# Patient Record
Sex: MALE | Race: WHITE | NOT HISPANIC OR LATINO
[De-identification: names, ages, dates, MRNs, and addresses within clinical notes are randomized per-mention and may not be internally consistent; named-entity substitution may affect disease eponyms.]

---

## 2020-04-22 ENCOUNTER — APPOINTMENT (OUTPATIENT)
Dept: ENDOCRINOLOGY | Facility: CLINIC | Age: 65
End: 2020-04-22

## 2020-08-20 ENCOUNTER — APPOINTMENT (OUTPATIENT)
Dept: ENDOCRINOLOGY | Facility: CLINIC | Age: 65
End: 2020-08-20
Payer: COMMERCIAL

## 2020-08-20 VITALS
BODY MASS INDEX: 25.73 KG/M2 | DIASTOLIC BLOOD PRESSURE: 70 MMHG | HEART RATE: 72 BPM | WEIGHT: 190 LBS | HEIGHT: 72 IN | RESPIRATION RATE: 16 BRPM | SYSTOLIC BLOOD PRESSURE: 110 MMHG | OXYGEN SATURATION: 98 %

## 2020-08-20 DIAGNOSIS — Z87.891 PERSONAL HISTORY OF NICOTINE DEPENDENCE: ICD-10-CM

## 2020-08-20 DIAGNOSIS — R42 DIZZINESS AND GIDDINESS: ICD-10-CM

## 2020-08-20 DIAGNOSIS — Z83.49 FAMILY HISTORY OF OTHER ENDOCRINE, NUTRITIONAL AND METABOLIC DISEASES: ICD-10-CM

## 2020-08-20 DIAGNOSIS — R94.6 ABNORMAL RESULTS OF THYROID FUNCTION STUDIES: ICD-10-CM

## 2020-08-20 DIAGNOSIS — N39.0 URINARY TRACT INFECTION, SITE NOT SPECIFIED: ICD-10-CM

## 2020-08-20 PROBLEM — Z00.00 ENCOUNTER FOR PREVENTIVE HEALTH EXAMINATION: Status: ACTIVE | Noted: 2020-08-20

## 2020-08-20 PROCEDURE — 99204 OFFICE O/P NEW MOD 45 MIN: CPT

## 2020-08-20 RX ORDER — TERAZOSIN HCL 5 MG
5 CAPSULE ORAL
Refills: 0 | Status: ACTIVE | COMMUNITY

## 2020-08-20 RX ORDER — MECLIZINE HYDROCHLORIDE 25 MG/1
25 TABLET ORAL 4 TIMES DAILY
Refills: 0 | Status: ACTIVE | COMMUNITY

## 2020-08-20 NOTE — ASSESSMENT
[FreeTextEntry1] : - repeat an extended thyroid panel, antibodies status\par - thyroid US\par - check am cortisol, ACTH, testosterone, gonadotropins\par RTC post labs

## 2020-08-20 NOTE — CONSULT LETTER
[Dear  ___] : Dear  [unfilled], [Sincerely,] : Sincerely, [FreeTextEntry1] : Thank you for referring  Mr. JOSE TREJO to me for evaluation and treatment. Please, see attached consultation note. As always, if there are specific questions you would like to discuss, please feel free to contact me.\par Thank you for the courtesy of this evaluation.\par  [FreeTextEntry3] : Saud Dobson MD, FACE, ECNU\par

## 2020-08-20 NOTE — HISTORY OF PRESENT ILLNESS
[FreeTextEntry1] : 65 year male referred for thyroid evaluation.\par Mr Eastman was diagnosed with Graves' disease about 15 years. He was under care of Dr. Snow at that time, and was treated with antithyroidal medications for about 3 years. He's been in remission since that time. He's concerned with his occasional dizziness and vertigo when he lies down for the past year. He saw an ENT and was diagnosed with Meniere disease. He also noticed some hot flashes, mostly at night. His erections are somewhat worse, and his libido has been very low.\par labs from 7/7/20- TSH- 0.88, T4- 7.8\par from 12/17/19- TSH- 0.12 and from 7/3/19- 0.33\par His ALP was persistently elevated ranging 123-143. he's not on any vitamin D supplements \par \par

## 2020-09-11 ENCOUNTER — APPOINTMENT (OUTPATIENT)
Dept: ENDOCRINOLOGY | Facility: CLINIC | Age: 65
End: 2020-09-11
Payer: COMMERCIAL

## 2020-09-11 ENCOUNTER — NON-APPOINTMENT (OUTPATIENT)
Age: 65
End: 2020-09-11

## 2020-09-11 PROCEDURE — 99214 OFFICE O/P EST MOD 30 MIN: CPT | Mod: 95

## 2020-09-11 NOTE — HISTORY OF PRESENT ILLNESS
[Home] : at home, [unfilled] , at the time of the visit. [Medical Office: ([facility])___] : at the medical office located in  [Verbal consent obtained from patient] : the patient, [unfilled] [FreeTextEntry1] : 65 year male f/u for multiple medical issues\par \par \par *** Sep 11, 2020 ***\par \par TSH- 0.4, FT4- 1.1, T3 - 128\par + TSI/TBII ab\par prl- 5.5\par am cortisol- 13.7 with ACTH-20\par testo- 4.35, free T- 55 but FSH/LH- 29/17.6\par \par patient states today, that he's been off meds for at least 8 years\par \par Thyr US (8/27/20)- RMP iso 0.8x0.6x0.4 (no micro), LMP iso 0.6x0.4x0.3 (no micro), isthmic 1.0x1.3x0.7 hypo with small central cystic component (no micro)\par \par HPI:\par Mr Eastman was diagnosed with Graves' disease about 15 years. He was under care of Dr. Snow at that time, and was treated with antithyroidal medications for about 3 years. He's been in remission since that time. He's concerned with his occasional dizziness and vertigo when he lies down for the past year. He saw an ENT and was diagnosed with Meniere disease. He also noticed some hot flashes, mostly at night. His erections are somewhat worse, and his libido has been very low.\par labs from 7/7/20- TSH- 0.88, T4- 7.8\par from 12/17/19- TSH- 0.12 and from 7/3/19- 0.33\par His ALP was persistently elevated ranging 123-143. he's not on any vitamin D supplements \par \par

## 2020-09-11 NOTE — ASSESSMENT
[FreeTextEntry1] : - repeat testo, g-tropins\par - given his symptoms, might benefit from T replacement\par - R+B of testosterone replacement reviewed in details, including worsening of KEVIN and potential adverse effects on CV system, effect on hematocrit, PSA, and liver functions, as well as decrease in sperm count.\par - I reviewed with the patient topical vs injectable vs implantable testosterone preparations, as well as proper use/applications/precautions and monitoring.\par - in regards of MNG, pt wants to proceed with FNA soon. Will schedule a bx of the isthmic nodule\par - I've also advised on Graves' (+) ab and need in frequent tft's monitoring

## 2020-09-11 NOTE — REASON FOR VISIT
[Follow - Up] : a follow-up visit [Hyperthyroidism] : hyperthyroidism [Thyroid nodule/ MNG] : thyroid nodule/ MNG [Hypogonadism] : hypogonadism

## 2020-09-18 ENCOUNTER — LABORATORY RESULT (OUTPATIENT)
Age: 65
End: 2020-09-18

## 2020-09-18 ENCOUNTER — APPOINTMENT (OUTPATIENT)
Dept: ENDOCRINOLOGY | Facility: CLINIC | Age: 65
End: 2020-09-18
Payer: COMMERCIAL

## 2020-09-18 PROCEDURE — 10005 FNA BX W/US GDN 1ST LES: CPT

## 2020-09-18 NOTE — PROCEDURE
[Fine Needle Aspiration] : Fine needle aspiration ~T ~C was performed. [Risks] : risks [Benefits] : benefits [Alternatives] : alternatives [Consent Obtained] : Written consent was obtained prior to the procedure and is detailed in the patient's record [Patient] : the patient [Ethyl Chloride] : ethyl chloride [EMLA Cream] : EMLA cream [25 gauge 1.5 inch] : A 25 gauge 1.5 inch needle was used [____ Passes] : [unfilled] passes were made through the mass [Ultrasonic Guidance] : ultrasound guidance was employed [Sent to Histology] : The specimens were prepared in the usual manner and sent to histology. [Thyroseq] : Thyroseq [Tolerated Well] : the patient tolerated the procedure well [Vital Signs Stable] : the vital signs were stable [No Complications] : There were no complications [Instructions Given] : Handouts/patient instructions were given to patient [___ Week(s)] : in [unfilled] week(s). [de-identified] : left isthmic 1.4 cm thyroid nodule

## 2020-11-23 ENCOUNTER — TRANSCRIPTION ENCOUNTER (OUTPATIENT)
Age: 65
End: 2020-11-23

## 2020-11-23 ENCOUNTER — APPOINTMENT (OUTPATIENT)
Dept: ENDOCRINOLOGY | Facility: CLINIC | Age: 65
End: 2020-11-23
Payer: COMMERCIAL

## 2020-11-23 VITALS
WEIGHT: 190 LBS | BODY MASS INDEX: 25.73 KG/M2 | OXYGEN SATURATION: 98 % | RESPIRATION RATE: 16 BRPM | DIASTOLIC BLOOD PRESSURE: 60 MMHG | TEMPERATURE: 97.5 F | HEART RATE: 74 BPM | HEIGHT: 72 IN | SYSTOLIC BLOOD PRESSURE: 110 MMHG

## 2020-11-23 PROCEDURE — 36415 COLL VENOUS BLD VENIPUNCTURE: CPT

## 2020-11-23 PROCEDURE — 99214 OFFICE O/P EST MOD 30 MIN: CPT | Mod: 25

## 2020-11-23 NOTE — ASSESSMENT
[FreeTextEntry1] : - repeat testo, g-tropins\par - given clinical improvement from TRT, patient wants to retry a small dose (1 depression daily) and monitor for symptoms\par - alternative options for axillary/IM applications reviewed, might try if no current regimen does not work\par - in regards of MNG, s/p benign FNA  of the isthmic nodule. Repeat thyroid US in 03/21\par - I've also advised on Graves' (+) ab and need in frequent tft's monitoring\par RTC 3 mos

## 2020-11-23 NOTE — HISTORY OF PRESENT ILLNESS
[FreeTextEntry1] : 65 year male f/u for multiple medical issues\par \par *** Nov 23, 2020 ***\par \par felt much better on androderm 2 depressions daily (decr hot flashes, thinking more clear less fatigue), but developed an insomnia and "weird dreams". stopped b/o that after 2 weeks\par repeat testo- 269, free T- 40\par FSH/LH-27/18\par \par s/p FNAB (9/18/20) of left isthmic 1.4cm nodule - benign, adenomatous nodular goiter\par \par *** Sep 11, 2020 ***\par \par TSH- 0.4, FT4- 1.1, T3 - 128\par + TSI/TBII ab\par prl- 5.5\par am cortisol- 13.7 with ACTH-20\par testo- 4.35, free T- 55 but FSH/LH- 29/17.6\par \par patient states today, that he's been off meds for at least 8 years\par \par Thyr US (8/27/20)- RMP iso 0.8x0.6x0.4 (no micro), LMP iso 0.6x0.4x0.3 (no micro), isthmic 1.0x1.3x0.7 hypo with small central cystic component (no micro)\par \par HPI:\par Mr Eastman was diagnosed with Graves' disease about 15 years. He was under care of Dr. Snow at that time, and was treated with antithyroidal medications for about 3 years. He's been in remission since that time. He's concerned with his occasional dizziness and vertigo when he lies down for the past year. He saw an ENT and was diagnosed with Meniere disease. He also noticed some hot flashes, mostly at night. His erections are somewhat worse, and his libido has been very low.\par labs from 7/7/20- TSH- 0.88, T4- 7.8\par from 12/17/19- TSH- 0.12 and from 7/3/19- 0.33\par His ALP was persistently elevated ranging 123-143. he's not on any vitamin D supplements \par \par

## 2020-11-24 ENCOUNTER — TRANSCRIPTION ENCOUNTER (OUTPATIENT)
Age: 65
End: 2020-11-24

## 2020-11-24 LAB
25(OH)D3 SERPL-MCNC: 13.7 NG/ML
ALBUMIN SERPL ELPH-MCNC: 4.5 G/DL
ALP BLD-CCNC: 117 U/L
ALT SERPL-CCNC: 51 U/L
AST SERPL-CCNC: 33 U/L
BASOPHILS # BLD AUTO: 0.03 K/UL
BASOPHILS NFR BLD AUTO: 0.5 %
BILIRUB DIRECT SERPL-MCNC: 0.1 MG/DL
BILIRUB INDIRECT SERPL-MCNC: 0.2 MG/DL
BILIRUB SERPL-MCNC: 0.3 MG/DL
EOSINOPHIL # BLD AUTO: 0.15 K/UL
EOSINOPHIL NFR BLD AUTO: 2.7 %
FSH SERPL-MCNC: 25.6 IU/L
HCT VFR BLD CALC: 44.5 %
HGB BLD-MCNC: 14.4 G/DL
IMM GRANULOCYTES NFR BLD AUTO: 0.4 %
LH SERPL-ACNC: 18 IU/L
LYMPHOCYTES # BLD AUTO: 1.56 K/UL
LYMPHOCYTES NFR BLD AUTO: 28 %
MAN DIFF?: NORMAL
MCHC RBC-ENTMCNC: 30.1 PG
MCHC RBC-ENTMCNC: 32.4 GM/DL
MCV RBC AUTO: 93.1 FL
MONOCYTES # BLD AUTO: 0.72 K/UL
MONOCYTES NFR BLD AUTO: 12.9 %
NEUTROPHILS # BLD AUTO: 3.09 K/UL
NEUTROPHILS NFR BLD AUTO: 55.5 %
PLATELET # BLD AUTO: 289 K/UL
PROT SERPL-MCNC: 6.9 G/DL
PSA SERPL-MCNC: 0.76 NG/ML
RBC # BLD: 4.78 M/UL
RBC # FLD: 12.8 %
SHBG SERPL-SCNC: 43 NMOL/L
T3 SERPL-MCNC: 124 NG/DL
T4 FREE SERPL-MCNC: 1 NG/DL
TSH SERPL-ACNC: 0.62 UIU/ML
WBC # FLD AUTO: 5.57 K/UL

## 2020-11-28 LAB
TESTOST BND SERPL-MCNC: 6.7 PG/ML
TESTOST SERPL-MCNC: 252.8 NG/DL

## 2020-12-08 ENCOUNTER — TRANSCRIPTION ENCOUNTER (OUTPATIENT)
Age: 65
End: 2020-12-08

## 2020-12-10 ENCOUNTER — TRANSCRIPTION ENCOUNTER (OUTPATIENT)
Age: 65
End: 2020-12-10

## 2021-02-18 ENCOUNTER — TRANSCRIPTION ENCOUNTER (OUTPATIENT)
Age: 66
End: 2021-02-18

## 2021-02-18 RX ORDER — TESTOSTERONE 20.25 MG/1.25G
1.62 GEL, METERED TRANSDERMAL
Qty: 1 | Refills: 0 | Status: DISCONTINUED | COMMUNITY
Start: 2020-10-02 | End: 2021-02-18

## 2021-03-08 ENCOUNTER — APPOINTMENT (OUTPATIENT)
Dept: ENDOCRINOLOGY | Facility: CLINIC | Age: 66
End: 2021-03-08
Payer: COMMERCIAL

## 2021-03-08 VITALS
HEART RATE: 68 BPM | OXYGEN SATURATION: 98 % | HEIGHT: 72 IN | SYSTOLIC BLOOD PRESSURE: 115 MMHG | BODY MASS INDEX: 25.73 KG/M2 | DIASTOLIC BLOOD PRESSURE: 60 MMHG | RESPIRATION RATE: 16 BRPM | TEMPERATURE: 98 F | WEIGHT: 190 LBS

## 2021-03-08 PROCEDURE — 99214 OFFICE O/P EST MOD 30 MIN: CPT

## 2021-03-08 PROCEDURE — 99072 ADDL SUPL MATRL&STAF TM PHE: CPT

## 2021-03-08 NOTE — HISTORY OF PRESENT ILLNESS
[FreeTextEntry1] : 65 year male f/u for multiple medical issues\par \par *** Mar 08, 2021 ***\par \par feels better on axiron 1 depression daily , drisdol 50K qw\par much  less hot flashes, no more brain fog. Insomnia improved since switching to 1 depression\par *** prev intolerant of androgel\par \par no recent thyr US\par labs from 2/19/21- LDL- 109, cRP- 6.5, 25D- 24, TSH- 2.6, T4- 7.3, PSA_ 0.9, H/H-14.7/44.7, normal lft's, a1c- 5.5\par \par *** Nov 23, 2020 ***\par \par felt much better on androderm 2 depressions daily (decr hot flashes, thinking more clear less fatigue), but developed an insomnia and "weird dreams". stopped b/o that after 2 weeks\par repeat testo- 269, free T- 40\par FSH/LH-27/18\par \par s/p FNAB (9/18/20) of left isthmic 1.4cm nodule - benign, adenomatous nodular goiter\par \par *** Sep 11, 2020 ***\par \par TSH- 0.4, FT4- 1.1, T3 - 128\par + TSI/TBII ab\par prl- 5.5\par am cortisol- 13.7 with ACTH-20\par testo- 4.35, free T- 55 but FSH/LH- 29/17.6\par \par patient states today, that he's been off meds for at least 8 years\par \par Thyr US (8/27/20)- RMP iso 0.8x0.6x0.4 (no micro), LMP iso 0.6x0.4x0.3 (no micro), isthmic 1.0x1.3x0.7 hypo with small central cystic component (no micro)\par \par HPI:\par Mr Eastman was diagnosed with Graves' disease about 15 years. He was under care of Dr. Snow at that time, and was treated with antithyroidal medications for about 3 years. He's been in remission since that time. He's concerned with his occasional dizziness and vertigo when he lies down for the past year. He saw an ENT and was diagnosed with Meniere disease. He also noticed some hot flashes, mostly at night. His erections are somewhat worse, and his libido has been very low.\par labs from 7/7/20- TSH- 0.88, T4- 7.8\par from 12/17/19- TSH- 0.12 and from 7/3/19- 0.33\par His ALP was persistently elevated ranging 123-143. he's not on any vitamin D supplements \par \par

## 2021-03-08 NOTE — ASSESSMENT
[FreeTextEntry1] : - repeat testo through\par - given clinical improvement from TRT, cont with axiron 1 depression daily and monitor for symptoms\par - in regards of MNG, s/p benign FNA  of the isthmic nodule. Repeat thyroid US in 03/21\par - I've also advised on Graves' (+) ab and need in frequent tft's monitoring\par - cardiology f/u advised (on TRT, elevated cRP)\par RTC 3 mos

## 2021-04-16 ENCOUNTER — TRANSCRIPTION ENCOUNTER (OUTPATIENT)
Age: 66
End: 2021-04-16

## 2021-04-23 ENCOUNTER — TRANSCRIPTION ENCOUNTER (OUTPATIENT)
Age: 66
End: 2021-04-23

## 2021-06-01 ENCOUNTER — TRANSCRIPTION ENCOUNTER (OUTPATIENT)
Age: 66
End: 2021-06-01

## 2021-06-17 ENCOUNTER — APPOINTMENT (OUTPATIENT)
Dept: ENDOCRINOLOGY | Facility: CLINIC | Age: 66
End: 2021-06-17
Payer: COMMERCIAL

## 2021-06-17 VITALS
HEART RATE: 66 BPM | WEIGHT: 195 LBS | BODY MASS INDEX: 26.41 KG/M2 | SYSTOLIC BLOOD PRESSURE: 105 MMHG | OXYGEN SATURATION: 98 % | TEMPERATURE: 98.4 F | HEIGHT: 72 IN | RESPIRATION RATE: 16 BRPM | DIASTOLIC BLOOD PRESSURE: 66 MMHG

## 2021-06-17 LAB
BASOPHILS # BLD AUTO: 0.07 K/UL
BASOPHILS NFR BLD AUTO: 1.1 %
EOSINOPHIL # BLD AUTO: 0.28 K/UL
EOSINOPHIL NFR BLD AUTO: 4.2 %
HCT VFR BLD CALC: 46.6 %
HGB BLD-MCNC: 15.2 G/DL
IMM GRANULOCYTES NFR BLD AUTO: 0.3 %
LYMPHOCYTES # BLD AUTO: 1.97 K/UL
LYMPHOCYTES NFR BLD AUTO: 29.7 %
MAN DIFF?: NORMAL
MCHC RBC-ENTMCNC: 30.3 PG
MCHC RBC-ENTMCNC: 32.6 GM/DL
MCV RBC AUTO: 93 FL
MONOCYTES # BLD AUTO: 0.88 K/UL
MONOCYTES NFR BLD AUTO: 13.3 %
NEUTROPHILS # BLD AUTO: 3.41 K/UL
NEUTROPHILS NFR BLD AUTO: 51.4 %
PLATELET # BLD AUTO: 286 K/UL
RBC # BLD: 5.01 M/UL
RBC # FLD: 13 %
WBC # FLD AUTO: 6.63 K/UL

## 2021-06-17 PROCEDURE — 99072 ADDL SUPL MATRL&STAF TM PHE: CPT

## 2021-06-17 PROCEDURE — 99214 OFFICE O/P EST MOD 30 MIN: CPT | Mod: 25

## 2021-06-17 PROCEDURE — 36415 COLL VENOUS BLD VENIPUNCTURE: CPT

## 2021-06-17 NOTE — HISTORY OF PRESENT ILLNESS
[FreeTextEntry1] : 65 year male f/u for multiple medical issues\par \par *** Jun 17, 2021 ***\par \par feels well. hot flashes and insomnia are significantly reduced since starting TRT. libido is still decreased, but energy has improved\par taking axiron 1 depression daily\par \par Thyr US (4/5/21)-  RMP 0.5x0.3x0.5, isthmic 1.5x1.3x0.7 iso - all stable. Prev noted Left thyroid nodule is not seen\par \par *** Mar 08, 2021 ***\par \par feels better on axiron 1 depression daily , drisdol 50K qw\par much  less hot flashes, no more brain fog. Insomnia improved since switching to 1 depression\par *** prev intolerant of androgel\par \par no recent thyr US\par labs from 2/19/21- LDL- 109, cRP- 6.5, 25D- 24, TSH- 2.6, T4- 7.3, PSA_ 0.9, H/H-14.7/44.7, normal lft's, a1c- 5.5\par \par *** Nov 23, 2020 ***\par \par felt much better on androderm 2 depressions daily (decr hot flashes, thinking more clear less fatigue), but developed an insomnia and "weird dreams". stopped b/o that after 2 weeks\par repeat testo- 269, free T- 40\par FSH/LH-27/18\par \par s/p FNAB (9/18/20) of left isthmic 1.4cm nodule - benign, adenomatous nodular goiter\par \par *** Sep 11, 2020 ***\par \par TSH- 0.4, FT4- 1.1, T3 - 128\par + TSI/TBII ab\par prl- 5.5\par am cortisol- 13.7 with ACTH-20\par testo- 4.35, free T- 55 but FSH/LH- 29/17.6\par \par patient states today, that he's been off meds for at least 8 years\par \par Thyr US (8/27/20)- RMP iso 0.8x0.6x0.4 (no micro), LMP iso 0.6x0.4x0.3 (no micro), isthmic 1.0x1.3x0.7 hypo with small central cystic component (no micro)\par \par HPI:\par Mr Eastman was diagnosed with Graves' disease about 15 years. He was under care of Dr. Snow at that time, and was treated with antithyroidal medications for about 3 years. He's been in remission since that time. He's concerned with his occasional dizziness and vertigo when he lies down for the past year. He saw an ENT and was diagnosed with Meniere disease. He also noticed some hot flashes, mostly at night. His erections are somewhat worse, and his libido has been very low.\par labs from 7/7/20- TSH- 0.88, T4- 7.8\par from 12/17/19- TSH- 0.12 and from 7/3/19- 0.33\par His ALP was persistently elevated ranging 123-143. he's not on any vitamin D supplements \par \par

## 2021-06-17 NOTE — ASSESSMENT
[FreeTextEntry1] : 1. Hypogonadism\par - repeat testo through today\par - given clinical improvement from TRT, cont with axiron 1 depression daily and monitor for symptoms\par - cardiology f/u advised (on TRT, elevated cRP)\par \par 2. MNG\par - s/p benign FNA  of the isthmic nodule. Stable sono- repeat thyroid US in 4/22\par - I've also advised on Graves' (+) ab and need in frequent tft's monitoring\par \par RTC 3-4 mos

## 2021-06-18 ENCOUNTER — TRANSCRIPTION ENCOUNTER (OUTPATIENT)
Age: 66
End: 2021-06-18

## 2021-06-18 LAB
25(OH)D3 SERPL-MCNC: 26.8 NG/ML
ALBUMIN SERPL ELPH-MCNC: 4.8 G/DL
ALP BLD-CCNC: 107 U/L
ALT SERPL-CCNC: 25 U/L
AST SERPL-CCNC: 22 U/L
BILIRUB DIRECT SERPL-MCNC: 0.1 MG/DL
BILIRUB INDIRECT SERPL-MCNC: 0.2 MG/DL
BILIRUB SERPL-MCNC: 0.2 MG/DL
PROT SERPL-MCNC: 7.4 G/DL
PSA SERPL-MCNC: 1 NG/ML
T3 SERPL-MCNC: 129 NG/DL
T4 FREE SERPL-MCNC: 1 NG/DL
TSH SERPL-ACNC: 0.65 UIU/ML

## 2021-06-20 ENCOUNTER — TRANSCRIPTION ENCOUNTER (OUTPATIENT)
Age: 66
End: 2021-06-20

## 2021-06-21 LAB
TESTOST BND SERPL-MCNC: 9.9 PG/ML
TESTOSTERONE TOTAL S: 356 NG/DL
TSH RECEPTOR AB: <1.1 IU/L

## 2021-06-22 LAB
SHBG SERPL-SCNC: 38.8 NMOL/L
TSI ACT/NOR SER: <0.1 IU/L

## 2021-08-10 ENCOUNTER — RX RENEWAL (OUTPATIENT)
Age: 66
End: 2021-08-10

## 2021-08-10 RX ORDER — ERGOCALCIFEROL 1.25 MG/1
1.25 MG CAPSULE, LIQUID FILLED ORAL
Qty: 13 | Refills: 2 | Status: ACTIVE | COMMUNITY
Start: 2020-11-24 | End: 1900-01-01

## 2021-08-24 ENCOUNTER — TRANSCRIPTION ENCOUNTER (OUTPATIENT)
Age: 66
End: 2021-08-24

## 2021-08-27 ENCOUNTER — TRANSCRIPTION ENCOUNTER (OUTPATIENT)
Age: 66
End: 2021-08-27

## 2021-11-23 ENCOUNTER — APPOINTMENT (OUTPATIENT)
Dept: ENDOCRINOLOGY | Facility: CLINIC | Age: 66
End: 2021-11-23
Payer: COMMERCIAL

## 2021-11-23 VITALS
SYSTOLIC BLOOD PRESSURE: 124 MMHG | DIASTOLIC BLOOD PRESSURE: 72 MMHG | BODY MASS INDEX: 26.41 KG/M2 | TEMPERATURE: 97.2 F | RESPIRATION RATE: 16 BRPM | OXYGEN SATURATION: 98 % | WEIGHT: 195 LBS | HEIGHT: 72 IN | HEART RATE: 82 BPM

## 2021-11-23 PROCEDURE — 36415 COLL VENOUS BLD VENIPUNCTURE: CPT

## 2021-11-23 PROCEDURE — 99214 OFFICE O/P EST MOD 30 MIN: CPT | Mod: 25

## 2021-11-23 NOTE — HISTORY OF PRESENT ILLNESS
[FreeTextEntry1] : 65 year male f/u for multiple medical issues\par \par *** Nov 23, 2021 ***\par \par feels great. hot flashes and night sweats are gone. Energy is good.  tolerates Axiron well. Libido is still low\par dx'ed with KEVIN , in a process of adjusting his CPAP machine\par \par *** Jun 17, 2021 ***\par \par feels well. hot flashes and insomnia are significantly reduced since starting TRT. libido is still decreased, but energy has improved\par taking axiron 1 depression daily\par \par Thyr US (4/5/21)-  RMP 0.5x0.3x0.5, isthmic 1.5x1.3x0.7 iso - all stable. Prev noted Left thyroid nodule is not seen\par \par *** Mar 08, 2021 ***\par \par feels better on axiron 1 depression daily , drisdol 50K qw\par much  less hot flashes, no more brain fog. Insomnia improved since switching to 1 depression\par *** prev intolerant of androgel\par \par no recent thyr US\par labs from 2/19/21- LDL- 109, cRP- 6.5, 25D- 24, TSH- 2.6, T4- 7.3, PSA_ 0.9, H/H-14.7/44.7, normal lft's, a1c- 5.5\par \par *** Nov 23, 2020 ***\par \par felt much better on androderm 2 depressions daily (decr hot flashes, thinking more clear less fatigue), but developed an insomnia and "weird dreams". stopped b/o that after 2 weeks\par repeat testo- 269, free T- 40\par FSH/LH-27/18\par \par s/p FNAB (9/18/20) of left isthmic 1.4cm nodule - benign, adenomatous nodular goiter\par \par *** Sep 11, 2020 ***\par \par TSH- 0.4, FT4- 1.1, T3 - 128\par + TSI/TBII ab\par prl- 5.5\par am cortisol- 13.7 with ACTH-20\par testo- 4.35, free T- 55 but FSH/LH- 29/17.6\par \par patient states today, that he's been off meds for at least 8 years\par \par Thyr US (8/27/20)- RMP iso 0.8x0.6x0.4 (no micro), LMP iso 0.6x0.4x0.3 (no micro), isthmic 1.0x1.3x0.7 hypo with small central cystic component (no micro)\par \par HPI:\par Mr Eastman was diagnosed with Graves' disease about 15 years. He was under care of Dr. Snow at that time, and was treated with antithyroidal medications for about 3 years. He's been in remission since that time. He's concerned with his occasional dizziness and vertigo when he lies down for the past year. He saw an ENT and was diagnosed with Meniere disease. He also noticed some hot flashes, mostly at night. His erections are somewhat worse, and his libido has been very low.\par labs from 7/7/20- TSH- 0.88, T4- 7.8\par from 12/17/19- TSH- 0.12 and from 7/3/19- 0.33\par His ALP was persistently elevated ranging 123-143. he's not on any vitamin D supplements \par \par

## 2021-11-23 NOTE — REASON FOR VISIT
[Follow - Up] : a follow-up visit [Hyperthyroidism] : hyperthyroidism [Thyroid nodule/ MNG] : thyroid nodule/ MNG [Hypogonadism] : hypogonadism [Spouse] : spouse

## 2021-11-23 NOTE — ASSESSMENT
[FreeTextEntry1] : 1. Hypogonadism\par - repeat testo through today\par - given clinical improvement from TRT, cont with axiron 1 depression daily and monitor for symptoms\par - cardiology f/u advised (on TRT, elevated cRP)\par \par 2. MNG\par - s/p benign FNA  of the isthmic nodule. Stable sono- repeat thyroid US in 4/22\par - I've also advised on Graves' (+) ab and need in frequent tft's monitoring\par \par RTC 4 mos

## 2021-11-24 LAB
25(OH)D3 SERPL-MCNC: 24.8 NG/ML
ALBUMIN SERPL ELPH-MCNC: 4.5 G/DL
ALP BLD-CCNC: 79 U/L
ALT SERPL-CCNC: 23 U/L
AST SERPL-CCNC: 20 U/L
BASOPHILS # BLD AUTO: 0.05 K/UL
BASOPHILS NFR BLD AUTO: 0.7 %
BILIRUB DIRECT SERPL-MCNC: 0.1 MG/DL
BILIRUB INDIRECT SERPL-MCNC: 0.2 MG/DL
BILIRUB SERPL-MCNC: 0.2 MG/DL
EOSINOPHIL # BLD AUTO: 0.35 K/UL
EOSINOPHIL NFR BLD AUTO: 5.2 %
HCT VFR BLD CALC: 46.3 %
HGB BLD-MCNC: 15 G/DL
IMM GRANULOCYTES NFR BLD AUTO: 0.4 %
LYMPHOCYTES # BLD AUTO: 1.99 K/UL
LYMPHOCYTES NFR BLD AUTO: 29.7 %
MAN DIFF?: NORMAL
MCHC RBC-ENTMCNC: 30.2 PG
MCHC RBC-ENTMCNC: 32.4 GM/DL
MCV RBC AUTO: 93.3 FL
MONOCYTES # BLD AUTO: 0.99 K/UL
MONOCYTES NFR BLD AUTO: 14.8 %
NEUTROPHILS # BLD AUTO: 3.29 K/UL
NEUTROPHILS NFR BLD AUTO: 49.2 %
PLATELET # BLD AUTO: 311 K/UL
PROT SERPL-MCNC: 7 G/DL
PSA SERPL-MCNC: 0.98 NG/ML
RBC # BLD: 4.96 M/UL
RBC # FLD: 13.1 %
T4 FREE SERPL-MCNC: 0.9 NG/DL
TSH SERPL-ACNC: 0.99 UIU/ML
WBC # FLD AUTO: 6.7 K/UL

## 2021-11-27 ENCOUNTER — TRANSCRIPTION ENCOUNTER (OUTPATIENT)
Age: 66
End: 2021-11-27

## 2021-11-27 LAB
SHBG SERPL-SCNC: 28 NMOL/L
TESTOST BND SERPL-MCNC: 6.2 PG/ML
TESTOSTERONE TOTAL S: 192 NG/DL

## 2022-01-21 ENCOUNTER — TRANSCRIPTION ENCOUNTER (OUTPATIENT)
Age: 67
End: 2022-01-21

## 2022-03-16 ENCOUNTER — TRANSCRIPTION ENCOUNTER (OUTPATIENT)
Age: 67
End: 2022-03-16

## 2022-05-13 ENCOUNTER — TRANSCRIPTION ENCOUNTER (OUTPATIENT)
Age: 67
End: 2022-05-13

## 2022-09-06 ENCOUNTER — APPOINTMENT (OUTPATIENT)
Dept: ENDOCRINOLOGY | Facility: CLINIC | Age: 67
End: 2022-09-06

## 2022-09-06 VITALS
SYSTOLIC BLOOD PRESSURE: 118 MMHG | RESPIRATION RATE: 16 BRPM | HEIGHT: 72 IN | HEART RATE: 71 BPM | DIASTOLIC BLOOD PRESSURE: 60 MMHG | TEMPERATURE: 98 F | BODY MASS INDEX: 26.41 KG/M2 | WEIGHT: 195 LBS | OXYGEN SATURATION: 98 %

## 2022-09-06 DIAGNOSIS — R68.82 DECREASED LIBIDO: ICD-10-CM

## 2022-09-06 DIAGNOSIS — N52.9 MALE ERECTILE DYSFUNCTION, UNSPECIFIED: ICD-10-CM

## 2022-09-06 PROCEDURE — 99214 OFFICE O/P EST MOD 30 MIN: CPT | Mod: 25

## 2022-09-06 PROCEDURE — 36415 COLL VENOUS BLD VENIPUNCTURE: CPT

## 2022-09-06 NOTE — ASSESSMENT
[FreeTextEntry1] : 1. Hypogonadism\par - repeat testo through today\par - given clinical improvement from TRT, cont with axiron 2 depressions daily and monitor for symptoms\par - cardiology f/u advised (on TRT, elevated cRP)\par \par 2. MNG\par - s/p benign FNA  of the isthmic nodule. Stable sono- repeat thyroid US this month\par - I've also advised on Graves' (+) ab and need in frequent tft's monitoring\par \par RTC 4-6 mos, or sooner prn

## 2022-09-06 NOTE — HISTORY OF PRESENT ILLNESS
[FreeTextEntry1] : 67 year male f/u for multiple medical issues\par \par *** Sep 06, 2022 ***\par \par doing well. good energy, but libido is still low. no hot flashes. erections are ok, rare sporadic am erections\par using axiron 2 depressions daily (last dose about 36 hrs ago)\par no recent labs or sono\par \par *** Nov 23, 2021 ***\par \par feels great. hot flashes and night sweats are gone. Energy is good.  tolerates Axiron well. Libido is still low\par dx'ed with KEVIN , in a process of adjusting his CPAP machine\par \par *** Jun 17, 2021 ***\par \par feels well. hot flashes and insomnia are significantly reduced since starting TRT. libido is still decreased, but energy has improved\par taking axiron 1 depression daily\par \par Thyr US (4/5/21)-  RMP 0.5x0.3x0.5, isthmic 1.5x1.3x0.7 iso - all stable. Prev noted Left thyroid nodule is not seen\par \par *** Mar 08, 2021 ***\par \par feels better on axiron 1 depression daily , drisdol 50K qw\par much  less hot flashes, no more brain fog. Insomnia improved since switching to 1 depression\par *** prev intolerant of androgel\par \par no recent thyr US\par labs from 2/19/21- LDL- 109, cRP- 6.5, 25D- 24, TSH- 2.6, T4- 7.3, PSA_ 0.9, H/H-14.7/44.7, normal lft's, a1c- 5.5\par \par *** Nov 23, 2020 ***\par \par felt much better on androderm 2 depressions daily (decr hot flashes, thinking more clear less fatigue), but developed an insomnia and "weird dreams". stopped b/o that after 2 weeks\par repeat testo- 269, free T- 40\par FSH/LH-27/18\par \par s/p FNAB (9/18/20) of left isthmic 1.4cm nodule - benign, adenomatous nodular goiter\par \par *** Sep 11, 2020 ***\par \par TSH- 0.4, FT4- 1.1, T3 - 128\par + TSI/TBII ab\par prl- 5.5\par am cortisol- 13.7 with ACTH-20\par testo- 4.35, free T- 55 but FSH/LH- 29/17.6\par \par patient states today, that he's been off meds for at least 8 years\par \par Thyr US (8/27/20)- RMP iso 0.8x0.6x0.4 (no micro), LMP iso 0.6x0.4x0.3 (no micro), isthmic 1.0x1.3x0.7 hypo with small central cystic component (no micro)\par \par HPI:\par Mr Eastman was diagnosed with Graves' disease about 15 years. He was under care of Dr. Snow at that time, and was treated with antithyroidal medications for about 3 years. He's been in remission since that time. He's concerned with his occasional dizziness and vertigo when he lies down for the past year. He saw an ENT and was diagnosed with Meniere disease. He also noticed some hot flashes, mostly at night. His erections are somewhat worse, and his libido has been very low.\par labs from 7/7/20- TSH- 0.88, T4- 7.8\par from 12/17/19- TSH- 0.12 and from 7/3/19- 0.33\par His ALP was persistently elevated ranging 123-143. he's not on any vitamin D supplements \par \par

## 2022-09-07 ENCOUNTER — TRANSCRIPTION ENCOUNTER (OUTPATIENT)
Age: 67
End: 2022-09-07

## 2022-09-07 LAB
25(OH)D3 SERPL-MCNC: 30.5 NG/ML
ALBUMIN SERPL ELPH-MCNC: 4.7 G/DL
ALP BLD-CCNC: 126 U/L
ALT SERPL-CCNC: 47 U/L
ANION GAP SERPL CALC-SCNC: 20 MMOL/L
AST SERPL-CCNC: 36 U/L
BASOPHILS # BLD AUTO: 0.03 K/UL
BASOPHILS NFR BLD AUTO: 0.4 %
BILIRUB SERPL-MCNC: 0.2 MG/DL
BUN SERPL-MCNC: 21 MG/DL
CALCIUM SERPL-MCNC: 9.5 MG/DL
CHLORIDE SERPL-SCNC: 105 MMOL/L
CO2 SERPL-SCNC: 19 MMOL/L
CREAT SERPL-MCNC: 1.09 MG/DL
EGFR: 73 ML/MIN/1.73M2
EOSINOPHIL # BLD AUTO: 0.19 K/UL
EOSINOPHIL NFR BLD AUTO: 2.7 %
ESTIMATED AVERAGE GLUCOSE: 117 MG/DL
GLUCOSE SERPL-MCNC: 90 MG/DL
HBA1C MFR BLD HPLC: 5.7 %
HCT VFR BLD CALC: 45.7 %
HGB BLD-MCNC: 14.9 G/DL
IMM GRANULOCYTES NFR BLD AUTO: 0.4 %
LYMPHOCYTES # BLD AUTO: 1.94 K/UL
LYMPHOCYTES NFR BLD AUTO: 28 %
MAN DIFF?: NORMAL
MCHC RBC-ENTMCNC: 30.2 PG
MCHC RBC-ENTMCNC: 32.6 GM/DL
MCV RBC AUTO: 92.5 FL
MONOCYTES # BLD AUTO: 0.75 K/UL
MONOCYTES NFR BLD AUTO: 10.8 %
NEUTROPHILS # BLD AUTO: 3.98 K/UL
NEUTROPHILS NFR BLD AUTO: 57.7 %
PLATELET # BLD AUTO: 302 K/UL
POTASSIUM SERPL-SCNC: 4.6 MMOL/L
PROT SERPL-MCNC: 7.3 G/DL
PSA SERPL-MCNC: 1.06 NG/ML
RBC # BLD: 4.94 M/UL
RBC # FLD: 13.2 %
SODIUM SERPL-SCNC: 143 MMOL/L
T3 SERPL-MCNC: 112 NG/DL
T4 FREE SERPL-MCNC: 1 NG/DL
THYROGLOB AB SERPL-ACNC: <20 IU/ML
THYROPEROXIDASE AB SERPL IA-ACNC: <10 IU/ML
TSH SERPL-ACNC: 0.89 UIU/ML
WBC # FLD AUTO: 6.92 K/UL

## 2022-09-09 LAB
SHBG SERPL-SCNC: 41.4 NMOL/L
TSH RECEPTOR AB: 1.32 IU/L
TSI ACT/NOR SER: <0.1 IU/L

## 2022-09-10 LAB
TESTOST FREE SERPL-MCNC: 5.5 PG/ML
TESTOST SERPL-MCNC: 274 NG/DL

## 2022-09-27 ENCOUNTER — APPOINTMENT (OUTPATIENT)
Dept: ULTRASOUND IMAGING | Facility: CLINIC | Age: 67
End: 2022-09-27

## 2022-09-27 PROCEDURE — 76536 US EXAM OF HEAD AND NECK: CPT

## 2022-09-28 ENCOUNTER — TRANSCRIPTION ENCOUNTER (OUTPATIENT)
Age: 67
End: 2022-09-28

## 2022-09-30 ENCOUNTER — TRANSCRIPTION ENCOUNTER (OUTPATIENT)
Age: 67
End: 2022-09-30

## 2022-10-27 ENCOUNTER — APPOINTMENT (OUTPATIENT)
Dept: ORTHOPEDIC SURGERY | Facility: CLINIC | Age: 67
End: 2022-10-27

## 2022-10-27 VITALS — WEIGHT: 180 LBS | BODY MASS INDEX: 24.38 KG/M2 | HEIGHT: 72 IN

## 2022-10-27 DIAGNOSIS — M79.672 PAIN IN LEFT FOOT: ICD-10-CM

## 2022-10-27 DIAGNOSIS — Z78.9 OTHER SPECIFIED HEALTH STATUS: ICD-10-CM

## 2022-10-27 PROCEDURE — 73600 X-RAY EXAM OF ANKLE: CPT | Mod: 50

## 2022-10-27 PROCEDURE — 73630 X-RAY EXAM OF FOOT: CPT | Mod: 50

## 2022-10-27 PROCEDURE — 99203 OFFICE O/P NEW LOW 30 MIN: CPT | Mod: 25

## 2022-10-27 NOTE — ASSESSMENT
[FreeTextEntry1] : patient has failed pt and csi\par emg to eval tarsal tunnel\par further plan pending emg results\par discussed possible surgical treatment pending emg findings

## 2022-10-27 NOTE — DATA REVIEWED
[MRI] : MRI [Left] : left [Foot] : foot [I reviewed the films/CD and additionally noted] : I reviewed the films/CD and additionally noted [FreeTextEntry1] : plantar fasciitis; some add dig min atrophy - jaredanger

## 2022-10-27 NOTE — PHYSICAL EXAM
[Bilateral] : foot and ankle bilaterally [NL (40)] : plantar flexion 40 degrees [NL 30)] : inversion 30 degrees [NL (20)] : eversion 20 degrees [5___] : eversion 5[unfilled]/5 [Positive Tinel sign at _____] : Positive Tinel sign at [unfilled] [2+] : dorsalis pedis pulse: 2+ [] : no achilles tendon tenderness [[id number]] : dorsiflexion 15 degrees

## 2022-10-27 NOTE — IMAGING
[Bilateral] : foot bilaterally [There are no fractures, subluxations or dislocations. No significant abnormalities are seen] : There are no fractures, subluxations or dislocations. No significant abnormalities are seen

## 2022-10-27 NOTE — HISTORY OF PRESENT ILLNESS
[6] : 6 [2] : 2 [Burning] : burning [Dull/Aching] : dull/aching [Sharp] : sharp [Constant] : constant [de-identified] : 10/27/2022: 1 year bilateral feet pain. was seeing a dpm who gave csi injection in june 2022 and completed PT. walking in regular shoes. is using a night splint in the PM. no dm/tob.  [] : Post Surgical Visit: no [FreeTextEntry1] : LT foot

## 2022-11-02 ENCOUNTER — APPOINTMENT (OUTPATIENT)
Dept: NEUROLOGY | Facility: CLINIC | Age: 67
End: 2022-11-02

## 2022-11-02 DIAGNOSIS — R20.0 ANESTHESIA OF SKIN: ICD-10-CM

## 2022-11-02 DIAGNOSIS — R20.2 ANESTHESIA OF SKIN: ICD-10-CM

## 2022-11-02 DIAGNOSIS — M79.671 PAIN IN RIGHT FOOT: ICD-10-CM

## 2022-11-02 DIAGNOSIS — M79.672 PAIN IN RIGHT FOOT: ICD-10-CM

## 2022-11-02 PROCEDURE — 99072 ADDL SUPL MATRL&STAF TM PHE: CPT

## 2022-11-02 PROCEDURE — 95912 NRV CNDJ TEST 11-12 STUDIES: CPT

## 2022-11-02 PROCEDURE — 95886 MUSC TEST DONE W/N TEST COMP: CPT

## 2022-11-03 ENCOUNTER — APPOINTMENT (OUTPATIENT)
Dept: ORTHOPEDIC SURGERY | Facility: CLINIC | Age: 67
End: 2022-11-03

## 2022-11-03 DIAGNOSIS — G57.52 TARSAL TUNNEL SYNDROME, LEFT LOWER LIMB: ICD-10-CM

## 2022-11-03 DIAGNOSIS — M72.2 PLANTAR FASCIAL FIBROMATOSIS: ICD-10-CM

## 2022-11-03 PROCEDURE — 99214 OFFICE O/P EST MOD 30 MIN: CPT

## 2022-11-03 PROCEDURE — 99072 ADDL SUPL MATRL&STAF TM PHE: CPT

## 2022-11-03 NOTE — DATA REVIEWED
[MRI] : MRI [Foot] : foot [I reviewed the films/CD and additionally noted] : I reviewed the films/CD and additionally noted [EMG Nerve Conduction] : A EMG Nerve Conduction test was completed of the [Left] : left [Lower extremity] : lower extremity [Positive] : positive [FreeTextEntry1] : plantar fasciitis; some add dig min atrophy - jaredanger

## 2022-11-03 NOTE — HISTORY OF PRESENT ILLNESS
[6] : 6 [2] : 2 [Burning] : burning [Dull/Aching] : dull/aching [Sharp] : sharp [Constant] : constant [de-identified] : 10/27/2022: 1 year bilateral feet pain. was seeing a dpm who gave csi injection in june 2022 and completed PT. walking in regular shoes. is using a night splint in the PM. no dm/tob. \par \par 11/03/2022:  EMG f/up. no sig change [] : Post Surgical Visit: no [FreeTextEntry1] : LT foot

## 2022-11-03 NOTE — ASSESSMENT
[FreeTextEntry1] : wbat\par discussed continued PT vs csi vs surgical treatment\par discussed surgical and non surgical treatment\par discussed pros/cons, risks/benefits, and recovery\par discussed plantar fascia release and tarsal tunnel release\par patient would like to try another course of PT\par f/up 6-8 wks\par \par The pros, cons, risks, benefits, and alternatives have been discussed.  The risks include but are not limited to infection, bleeding, injury to small nerves and blood vessels, pain, stiffness, progression, dvt, PE, amputation and death. Expected recovery time was discussed. All the patient's questions were answered and they would like to proceed.\par \par Discussed risk of continued pain despite surgery

## 2022-12-15 ENCOUNTER — APPOINTMENT (OUTPATIENT)
Dept: ORTHOPEDIC SURGERY | Facility: CLINIC | Age: 67
End: 2022-12-15

## 2022-12-22 ENCOUNTER — TRANSCRIPTION ENCOUNTER (OUTPATIENT)
Age: 67
End: 2022-12-22

## 2022-12-28 ENCOUNTER — APPOINTMENT (OUTPATIENT)
Dept: ULTRASOUND IMAGING | Facility: CLINIC | Age: 67
End: 2022-12-28
Payer: COMMERCIAL

## 2022-12-28 PROCEDURE — 76536 US EXAM OF HEAD AND NECK: CPT

## 2023-03-09 ENCOUNTER — LABORATORY RESULT (OUTPATIENT)
Age: 68
End: 2023-03-09

## 2023-03-09 ENCOUNTER — APPOINTMENT (OUTPATIENT)
Dept: ENDOCRINOLOGY | Facility: CLINIC | Age: 68
End: 2023-03-09
Payer: MEDICARE

## 2023-03-09 VITALS
OXYGEN SATURATION: 99 % | WEIGHT: 189 LBS | HEIGHT: 72 IN | TEMPERATURE: 98 F | SYSTOLIC BLOOD PRESSURE: 132 MMHG | HEART RATE: 87 BPM | DIASTOLIC BLOOD PRESSURE: 60 MMHG | BODY MASS INDEX: 25.6 KG/M2 | RESPIRATION RATE: 16 BRPM

## 2023-03-09 DIAGNOSIS — E55.9 VITAMIN D DEFICIENCY, UNSPECIFIED: ICD-10-CM

## 2023-03-09 PROCEDURE — 99214 OFFICE O/P EST MOD 30 MIN: CPT

## 2023-03-09 PROCEDURE — 36415 COLL VENOUS BLD VENIPUNCTURE: CPT

## 2023-03-09 NOTE — HISTORY OF PRESENT ILLNESS
[FreeTextEntry1] : 67 year male f/u for multiple medical issues\par \par *** Mar 09, 2023 ***\par \par feels ok overall, with somewhat increase hot flashes , mostly early am. also, with some brain fog/ feeling that memory is no longer that sharp. has been taking meclizine qd (always at night) to qid for his vertigo. \par taking axiron 2 depressions daily (last application 24 hrs before). libido is about the same\par not using cpap, but has a mouthpiece \par Thyr US (12/28/22)- RMP iso hetero 0.7x0.6x0.5 (no calcs, TR3), dominant isthmic 2.4x2.1x0.7 (TR3)\par \par *** Sep 06, 2022 ***\par \par doing well. good energy, but libido is still low. no hot flashes. erections are ok, rare sporadic am erections\par using axiron 2 depressions daily (last dose about 36 hrs ago)\par no recent labs or sono\par \par *** Nov 23, 2021 ***\par \par feels great. hot flashes and night sweats are gone. Energy is good.  tolerates Axiron well. Libido is still low\par dx'ed with KEVIN , in a process of adjusting his CPAP machine\par \par *** Jun 17, 2021 ***\par \par feels well. hot flashes and insomnia are significantly reduced since starting TRT. libido is still decreased, but energy has improved\par taking axiron 1 depression daily\par \par Thyr US (4/5/21)-  RMP 0.5x0.3x0.5, isthmic 1.5x1.3x0.7 iso - all stable. Prev noted Left thyroid nodule is not seen\par \par *** Mar 08, 2021 ***\par \par feels better on axiron 1 depression daily , drisdol 50K qw\par much  less hot flashes, no more brain fog. Insomnia improved since switching to 1 depression\par *** prev intolerant of androgel\par \par no recent thyr US\par labs from 2/19/21- LDL- 109, cRP- 6.5, 25D- 24, TSH- 2.6, T4- 7.3, PSA_ 0.9, H/H-14.7/44.7, normal lft's, a1c- 5.5\par \par *** Nov 23, 2020 ***\par \par felt much better on androderm 2 depressions daily (decr hot flashes, thinking more clear less fatigue), but developed an insomnia and "weird dreams". stopped b/o that after 2 weeks\par repeat testo- 269, free T- 40\par FSH/LH-27/18\par \par s/p FNAB (9/18/20) of left isthmic 1.4cm nodule - benign, adenomatous nodular goiter\par \par *** Sep 11, 2020 ***\par \par TSH- 0.4, FT4- 1.1, T3 - 128\par + TSI/TBII ab\par prl- 5.5\par am cortisol- 13.7 with ACTH-20\par testo- 4.35, free T- 55 but FSH/LH- 29/17.6\par \par patient states today, that he's been off meds for at least 8 years\par \par Thyr US (8/27/20)- RMP iso 0.8x0.6x0.4 (no micro), LMP iso 0.6x0.4x0.3 (no micro), isthmic 1.0x1.3x0.7 hypo with small central cystic component (no micro)\par \par HPI:\par Mr Eastman was diagnosed with Graves' disease about 15 years. He was under care of Dr. Snow at that time, and was treated with antithyroidal medications for about 3 years. He's been in remission since that time. He's concerned with his occasional dizziness and vertigo when he lies down for the past year. He saw an ENT and was diagnosed with Meniere disease. He also noticed some hot flashes, mostly at night. His erections are somewhat worse, and his libido has been very low.\par labs from 7/7/20- TSH- 0.88, T4- 7.8\par from 12/17/19- TSH- 0.12 and from 7/3/19- 0.33\par His ALP was persistently elevated ranging 123-143. he's not on any vitamin D supplements \par \par

## 2023-03-09 NOTE — ASSESSMENT
[FreeTextEntry1] : 1. Hypogonadism\par - repeat testo through today\par - given clinical improvement from TRT, cont with axiron 2 depressions daily and monitor for symptoms\par - cardiology f/u advised (on TRT, elevated cRP)\par - advised that post-marketing data showed some memory impairment and hot flashes on meclizine. will try to stop it and observe\par \par 2. MNG\par - s/p benign FNA  of the isthmic nodule. Apparently growing nodule- will repeat thyroid US this month at [facility] (to compare with the initial size). if confirmed a substantial growth, will reFNA\par - I've also advised on Graves' (+) ab and need in frequent tft's monitoring\par \par RTC 4-6 mos, or sooner prn

## 2023-03-13 ENCOUNTER — NON-APPOINTMENT (OUTPATIENT)
Age: 68
End: 2023-03-13

## 2023-03-13 LAB
ALBUMIN SERPL ELPH-MCNC: 4.5 G/DL
ALP BLD-CCNC: 128 U/L
ALT SERPL-CCNC: 42 U/L
ANION GAP SERPL CALC-SCNC: 13 MMOL/L
AST SERPL-CCNC: 27 U/L
BASOPHILS # BLD AUTO: 0.06 K/UL
BASOPHILS NFR BLD AUTO: 0.9 %
BILIRUB SERPL-MCNC: 0.4 MG/DL
BUN SERPL-MCNC: 21 MG/DL
CALCIUM SERPL-MCNC: 9.5 MG/DL
CHLORIDE SERPL-SCNC: 103 MMOL/L
CO2 SERPL-SCNC: 25 MMOL/L
CREAT SERPL-MCNC: 1.05 MG/DL
EGFR: 77 ML/MIN/1.73M2
EOSINOPHIL # BLD AUTO: 0.11 K/UL
EOSINOPHIL NFR BLD AUTO: 1.7 %
GLUCOSE SERPL-MCNC: 90 MG/DL
HCT VFR BLD CALC: 46.8 %
HGB BLD-MCNC: 15 G/DL
LYMPHOCYTES # BLD AUTO: 1.31 K/UL
LYMPHOCYTES NFR BLD AUTO: 19.8 %
MAN DIFF?: NORMAL
MCHC RBC-ENTMCNC: 31 PG
MCHC RBC-ENTMCNC: 32.1 GM/DL
MCV RBC AUTO: 96.7 FL
MONOCYTES # BLD AUTO: 0.68 K/UL
MONOCYTES NFR BLD AUTO: 10.3 %
NEUTROPHILS # BLD AUTO: 4.28 K/UL
NEUTROPHILS NFR BLD AUTO: 64.7 %
PLATELET # BLD AUTO: 280 K/UL
POTASSIUM SERPL-SCNC: 4.5 MMOL/L
PROT SERPL-MCNC: 7.2 G/DL
PSA SERPL-MCNC: 1.1 NG/ML
RBC # BLD: 4.84 M/UL
RBC # FLD: 13.2 %
SHBG SERPL-SCNC: 39.7 NMOL/L
SODIUM SERPL-SCNC: 141 MMOL/L
T3 SERPL-MCNC: 123 NG/DL
T4 FREE SERPL-MCNC: 1 NG/DL
TESTOST FREE SERPL-MCNC: 4.6 PG/ML
TESTOST SERPL-MCNC: 257 NG/DL
TSH RECEPTOR AB: <1.1 IU/L
TSH SERPL-ACNC: 0.36 UIU/ML
TSI ACT/NOR SER: <0.1 IU/L
WBC # FLD AUTO: 6.62 K/UL

## 2023-03-15 LAB
METANEPHRINE, PL: 25.3 PG/ML
NORMETANEPHRINE, PL: 148 PG/ML

## 2023-04-03 ENCOUNTER — TRANSCRIPTION ENCOUNTER (OUTPATIENT)
Age: 68
End: 2023-04-03

## 2023-05-03 ENCOUNTER — NON-APPOINTMENT (OUTPATIENT)
Age: 68
End: 2023-05-03

## 2023-06-05 ENCOUNTER — TRANSCRIPTION ENCOUNTER (OUTPATIENT)
Age: 68
End: 2023-06-05

## 2023-08-16 ENCOUNTER — TRANSCRIPTION ENCOUNTER (OUTPATIENT)
Age: 68
End: 2023-08-16

## 2023-08-29 ENCOUNTER — TRANSCRIPTION ENCOUNTER (OUTPATIENT)
Age: 68
End: 2023-08-29

## 2023-08-29 ENCOUNTER — NON-APPOINTMENT (OUTPATIENT)
Age: 68
End: 2023-08-29

## 2023-09-27 ENCOUNTER — APPOINTMENT (OUTPATIENT)
Dept: ENDOCRINOLOGY | Facility: CLINIC | Age: 68
End: 2023-09-27
Payer: MEDICARE

## 2023-09-27 VITALS
HEART RATE: 83 BPM | SYSTOLIC BLOOD PRESSURE: 122 MMHG | BODY MASS INDEX: 26.06 KG/M2 | WEIGHT: 192.4 LBS | OXYGEN SATURATION: 97 % | RESPIRATION RATE: 16 BRPM | TEMPERATURE: 97.6 F | DIASTOLIC BLOOD PRESSURE: 62 MMHG | HEIGHT: 72 IN

## 2023-09-27 PROCEDURE — 36415 COLL VENOUS BLD VENIPUNCTURE: CPT

## 2023-09-27 PROCEDURE — 99214 OFFICE O/P EST MOD 30 MIN: CPT | Mod: 25

## 2023-09-29 ENCOUNTER — TRANSCRIPTION ENCOUNTER (OUTPATIENT)
Age: 68
End: 2023-09-29

## 2023-09-29 LAB
ALBUMIN SERPL ELPH-MCNC: 4.8 G/DL
ALP BLD-CCNC: 90 U/L
ALT SERPL-CCNC: 19 U/L
ANION GAP SERPL CALC-SCNC: 12 MMOL/L
AST SERPL-CCNC: 21 U/L
BASOPHILS # BLD AUTO: 0.05 K/UL
BASOPHILS NFR BLD AUTO: 0.7 %
BILIRUB SERPL-MCNC: 0.4 MG/DL
BUN SERPL-MCNC: 24 MG/DL
CALCIUM SERPL-MCNC: 9.5 MG/DL
CHLORIDE SERPL-SCNC: 102 MMOL/L
CO2 SERPL-SCNC: 25 MMOL/L
CREAT SERPL-MCNC: 1.1 MG/DL
EGFR: 72 ML/MIN/1.73M2
EOSINOPHIL # BLD AUTO: 0.18 K/UL
EOSINOPHIL NFR BLD AUTO: 2.6 %
ESTIMATED AVERAGE GLUCOSE: 120 MG/DL
GLUCOSE SERPL-MCNC: 95 MG/DL
HBA1C MFR BLD HPLC: 5.8 %
HCT VFR BLD CALC: 46.4 %
HGB BLD-MCNC: 15.6 G/DL
IMM GRANULOCYTES NFR BLD AUTO: 0.3 %
LYMPHOCYTES # BLD AUTO: 1.84 K/UL
LYMPHOCYTES NFR BLD AUTO: 26.3 %
MAN DIFF?: NORMAL
MCHC RBC-ENTMCNC: 31.3 PG
MCHC RBC-ENTMCNC: 33.6 GM/DL
MCV RBC AUTO: 93.2 FL
MONOCYTES # BLD AUTO: 0.78 K/UL
MONOCYTES NFR BLD AUTO: 11.1 %
NEUTROPHILS # BLD AUTO: 4.13 K/UL
NEUTROPHILS NFR BLD AUTO: 59 %
PLATELET # BLD AUTO: 281 K/UL
POTASSIUM SERPL-SCNC: 4.8 MMOL/L
PROT SERPL-MCNC: 7.4 G/DL
PSA SERPL-MCNC: 1.33 NG/ML
RBC # BLD: 4.98 M/UL
RBC # FLD: 13.2 %
SHBG SERPL-SCNC: 31.1 NMOL/L
SODIUM SERPL-SCNC: 139 MMOL/L
T4 FREE SERPL-MCNC: 1.2 NG/DL
TESTOST FREE SERPL-MCNC: 10.4 PG/ML
TESTOST SERPL-MCNC: 302 NG/DL
TSH SERPL-ACNC: 1.29 UIU/ML
TSI ACT/NOR SER: <0.1 IU/L
WBC # FLD AUTO: 7 K/UL

## 2023-10-01 LAB — TSH RECEPTOR AB: <1.1 IU/L

## 2023-11-02 ENCOUNTER — NON-APPOINTMENT (OUTPATIENT)
Age: 68
End: 2023-11-02

## 2023-11-05 ENCOUNTER — TRANSCRIPTION ENCOUNTER (OUTPATIENT)
Age: 68
End: 2023-11-05

## 2024-01-26 ENCOUNTER — NON-APPOINTMENT (OUTPATIENT)
Age: 69
End: 2024-01-26

## 2024-03-13 ENCOUNTER — APPOINTMENT (OUTPATIENT)
Dept: ENDOCRINOLOGY | Facility: CLINIC | Age: 69
End: 2024-03-13
Payer: COMMERCIAL

## 2024-03-13 VITALS
HEART RATE: 71 BPM | WEIGHT: 197 LBS | OXYGEN SATURATION: 98 % | HEIGHT: 72 IN | SYSTOLIC BLOOD PRESSURE: 122 MMHG | BODY MASS INDEX: 26.68 KG/M2 | RESPIRATION RATE: 16 BRPM | TEMPERATURE: 97.6 F | DIASTOLIC BLOOD PRESSURE: 70 MMHG

## 2024-03-13 DIAGNOSIS — E04.2 NONTOXIC MULTINODULAR GOITER: ICD-10-CM

## 2024-03-13 DIAGNOSIS — M79.10 MYALGIA, UNSPECIFIED SITE: ICD-10-CM

## 2024-03-13 DIAGNOSIS — Z86.39 PERSONAL HISTORY OF OTHER ENDOCRINE, NUTRITIONAL AND METABOLIC DISEASE: ICD-10-CM

## 2024-03-13 PROCEDURE — 99214 OFFICE O/P EST MOD 30 MIN: CPT

## 2024-03-13 NOTE — HISTORY OF PRESENT ILLNESS
[FreeTextEntry1] : 68 year male f/u for multiple medical issues  *** Mar 13, 2024 ***  back on TRT, feels much better. hot flashes are almost gone taking 2 to 3 depressions daily has been taking less meclizine and doing much better with some joint pain  *** Sep 27, 2023 ***  stopped TRT for a week, b/o "was getting leg cramps". no resolution with stopping- but again developed hot flashes. resumed TRT and feels much better now. drinking alkaline water which helps with leg cramps. still taking meclizine for his vertigo on axiron 2 depressions daily (last application yesterday)  Thyroid US (4/23/23)- RMP 0.7x0.6x0.6 echogenic, LMP 0.4x0.3x0.5 iso. Prev dominant nodule was not seen  *** Mar 09, 2023 ***  feels ok overall, with somewhat increase hot flashes , mostly early am. also, with some brain fog/ feeling that memory is no longer that sharp. has been taking meclizine qd (always at night) to qid for his vertigo.  taking axiron 2 depressions daily (last application 24 hrs before). libido is about the same not using cpap, but has a mouthpiece  Thyr US (12/28/22)- RMP iso hetero 0.7x0.6x0.5 (no calcs, TR3), dominant isthmic 2.4x2.1x0.7 (TR3)  *** Sep 06, 2022 ***  doing well. good energy, but libido is still low. no hot flashes. erections are ok, rare sporadic am erections using axiron 2 depressions daily (last dose about 36 hrs ago) no recent labs or sono  *** Nov 23, 2021 ***  feels great. hot flashes and night sweats are gone. Energy is good.  tolerates Axiron well. Libido is still low dx'ed with KEVIN , in a process of adjusting his CPAP machine  *** Jun 17, 2021 ***  feels well. hot flashes and insomnia are significantly reduced since starting TRT. libido is still decreased, but energy has improved taking axiron 1 depression daily  Thyr US (4/5/21)-  RMP 0.5x0.3x0.5, isthmic 1.5x1.3x0.7 iso - all stable. Prev noted Left thyroid nodule is not seen  *** Mar 08, 2021 ***  feels better on axiron 1 depression daily , drisdol 50K qw much  less hot flashes, no more brain fog. Insomnia improved since switching to 1 depression *** prev intolerant of androgel  no recent thyr US labs from 2/19/21- LDL- 109, cRP- 6.5, 25D- 24, TSH- 2.6, T4- 7.3, PSA_ 0.9, H/H-14.7/44.7, normal lft's, a1c- 5.5  *** Nov 23, 2020 ***  felt much better on androderm 2 depressions daily (decr hot flashes, thinking more clear less fatigue), but developed an insomnia and "weird dreams". stopped b/o that after 2 weeks repeat testo- 269, free T- 40 FSH/LH-27/18  s/p FNAB (9/18/20) of left isthmic 1.4cm nodule - benign, adenomatous nodular goiter  *** Sep 11, 2020 ***  TSH- 0.4, FT4- 1.1, T3 - 128 + TSI/TBII ab prl- 5.5 am cortisol- 13.7 with ACTH-20 testo- 4.35, free T- 55 but FSH/LH- 29/17.6  patient states today, that he's been off meds for at least 8 years  Thyr US (8/27/20)- RMP iso 0.8x0.6x0.4 (no micro), LMP iso 0.6x0.4x0.3 (no micro), isthmic 1.0x1.3x0.7 hypo with small central cystic component (no micro)  HPI: Mr Eastman was diagnosed with Graves' disease about 15 years. He was under care of Dr. Snow at that time, and was treated with antithyroidal medications for about 3 years. He's been in remission since that time. He's concerned with his occasional dizziness and vertigo when he lies down for the past year. He saw an ENT and was diagnosed with Meniere disease. He also noticed some hot flashes, mostly at night. His erections are somewhat worse, and his libido has been very low. labs from 7/7/20- TSH- 0.88, T4- 7.8 from 12/17/19- TSH- 0.12 and from 7/3/19- 0.33 His ALP was persistently elevated ranging 123-143. he's not on any vitamin D supplements

## 2024-03-13 NOTE — ASSESSMENT
[FreeTextEntry1] : 1. Hypogonadism - repeat testo through later this week - given clinical improvement from TRT, cont with axiron 2-3 depressions daily and monitor for symptoms - cardiology f/u advised (on TRT, elevated cRP) - advised that post-marketing data showed some memory impairment and hot flashes on meclizine. will try to stop it and observe  2. MNG - s/p benign FNA  of the isthmic nodule. Essentially stable sono - will repeat thyroid US in 05/24 at [facility] (to compare with the initial size). if confirmed a substantial growth, will reFNA - I've also advised on Graves' (+) ab and need in frequent tft's monitoring  RTC 4-6 mos, or sooner prn

## 2024-03-14 ENCOUNTER — APPOINTMENT (OUTPATIENT)
Dept: ENDOCRINOLOGY | Facility: CLINIC | Age: 69
End: 2024-03-14

## 2024-03-18 LAB
25(OH)D3 SERPL-MCNC: 30.4 NG/ML
ALBUMIN SERPL ELPH-MCNC: 4.5 G/DL
ALP BLD-CCNC: 85 U/L
ALT SERPL-CCNC: 34 U/L
ANION GAP SERPL CALC-SCNC: 12 MMOL/L
AST SERPL-CCNC: 27 U/L
BASOPHILS # BLD AUTO: 0.05 K/UL
BASOPHILS NFR BLD AUTO: 0.8 %
BILIRUB SERPL-MCNC: 0.4 MG/DL
BUN SERPL-MCNC: 24 MG/DL
CALCIUM SERPL-MCNC: 9.3 MG/DL
CHLORIDE SERPL-SCNC: 105 MMOL/L
CO2 SERPL-SCNC: 26 MMOL/L
CREAT SERPL-MCNC: 1.25 MG/DL
EGFR: 62 ML/MIN/1.73M2
EOSINOPHIL # BLD AUTO: 0.09 K/UL
EOSINOPHIL NFR BLD AUTO: 1.4 %
GLUCOSE SERPL-MCNC: 74 MG/DL
HCT VFR BLD CALC: 45 %
HGB BLD-MCNC: 15.1 G/DL
IMM GRANULOCYTES NFR BLD AUTO: 0.5 %
LYMPHOCYTES # BLD AUTO: 1.77 K/UL
LYMPHOCYTES NFR BLD AUTO: 27.1 %
MAN DIFF?: NORMAL
MCHC RBC-ENTMCNC: 29.9 PG
MCHC RBC-ENTMCNC: 33.6 GM/DL
MCV RBC AUTO: 89.1 FL
MONOCYTES # BLD AUTO: 0.82 K/UL
MONOCYTES NFR BLD AUTO: 12.6 %
NEUTROPHILS # BLD AUTO: 3.76 K/UL
NEUTROPHILS NFR BLD AUTO: 57.6 %
PLATELET # BLD AUTO: 305 K/UL
POTASSIUM SERPL-SCNC: 4.8 MMOL/L
PROT SERPL-MCNC: 6.8 G/DL
PSA SERPL-MCNC: 1.42 NG/ML
RBC # BLD: 5.05 M/UL
RBC # FLD: 13.5 %
RHEUMATOID FACT SER QL: <10 IU/ML
SHBG SERPL-SCNC: 30.7 NMOL/L
SODIUM SERPL-SCNC: 143 MMOL/L
T4 FREE SERPL-MCNC: 1.1 NG/DL
TESTOST FREE SERPL-MCNC: 1.2 PG/ML
TESTOST SERPL-MCNC: 298 NG/DL
TSH SERPL-ACNC: 1.62 UIU/ML
WBC # FLD AUTO: 6.52 K/UL

## 2024-03-19 LAB — DSDNA AB SER-ACNC: 13 IU/ML

## 2024-03-20 LAB
ANA SER IF-ACNC: NEGATIVE
CCP AB SER IA-ACNC: <8 UNITS
RF+CCP IGG SER-IMP: NEGATIVE

## 2024-04-03 ENCOUNTER — NON-APPOINTMENT (OUTPATIENT)
Age: 69
End: 2024-04-03

## 2024-06-06 ENCOUNTER — NON-APPOINTMENT (OUTPATIENT)
Age: 69
End: 2024-06-06

## 2024-06-06 RX ORDER — TESTOSTERONE 30 MG/1.5ML
30 SOLUTION TOPICAL
Qty: 2 | Refills: 0 | Status: ACTIVE | COMMUNITY
Start: 2020-12-10 | End: 1900-01-01

## 2024-08-08 ENCOUNTER — NON-APPOINTMENT (OUTPATIENT)
Age: 69
End: 2024-08-08

## 2024-09-27 ENCOUNTER — APPOINTMENT (OUTPATIENT)
Dept: ENDOCRINOLOGY | Facility: CLINIC | Age: 69
End: 2024-09-27
Payer: COMMERCIAL

## 2024-09-27 VITALS
DIASTOLIC BLOOD PRESSURE: 79 MMHG | WEIGHT: 176 LBS | SYSTOLIC BLOOD PRESSURE: 105 MMHG | BODY MASS INDEX: 23.84 KG/M2 | OXYGEN SATURATION: 99 % | HEIGHT: 72 IN | HEART RATE: 78 BPM

## 2024-09-27 DIAGNOSIS — Z86.39 PERSONAL HISTORY OF OTHER ENDOCRINE, NUTRITIONAL AND METABOLIC DISEASE: ICD-10-CM

## 2024-09-27 DIAGNOSIS — E04.2 NONTOXIC MULTINODULAR GOITER: ICD-10-CM

## 2024-09-27 PROCEDURE — 36415 COLL VENOUS BLD VENIPUNCTURE: CPT

## 2024-09-27 PROCEDURE — 99214 OFFICE O/P EST MOD 30 MIN: CPT

## 2024-09-27 NOTE — ASSESSMENT
[FreeTextEntry1] : 1. Hypogonadism - repeat testo through later this week - given clinical improvement from TRT, cont with axiron 2-3 depressions daily and monitor for symptoms - cardiology f/u advised (on TRT, elevated cRP) - advised that post-marketing data showed some memory impairment and hot flashes on meclizine. will try to stop it and observe  2. MNG - s/p benign FNA  of the isthmic nodule. Essentially stable sono - will repeat thyroid US soon at [facility] (to compare with the initial size). if confirmed a substantial growth, will reFNA - I've also advised on Graves' (+) ab and need in frequent tft's monitoring  RTC 4-6 mos, or sooner prn

## 2024-09-27 NOTE — HISTORY OF PRESENT ILLNESS
[FreeTextEntry1] : 69 year male f/u for multiple medical issues  *** Sep 27, 2024 ***  feels great. went on a diet- lost ~ 20 lbs hot flashes a\have mostly subsided. not using mclizine anymore  taking Axiron 3 depressions daily (last applied ~ 4 hrs ago) no recent labs did not repeat thyroid US yet  *** Mar 13, 2024 ***  back on TRT, feels much better. hot flashes are almost gone taking 2 to 3 depressions daily has been taking less meclizine and doing much better with some joint pain  *** Sep 27, 2023 ***  stopped TRT for a week, b/o "was getting leg cramps". no resolution with stopping- but again developed hot flashes. resumed TRT and feels much better now. drinking alkaline water which helps with leg cramps. still taking meclizine for his vertigo on axiron 2 depressions daily (last application yesterday)  Thyroid US (4/23/23)- RMP 0.7x0.6x0.6 echogenic, LMP 0.4x0.3x0.5 iso. Prev dominant nodule was not seen  *** Mar 09, 2023 ***  feels ok overall, with somewhat increase hot flashes , mostly early am. also, with some brain fog/ feeling that memory is no longer that sharp. has been taking meclizine qd (always at night) to qid for his vertigo.  taking axiron 2 depressions daily (last application 24 hrs before). libido is about the same not using cpap, but has a mouthpiece  Thyr US (12/28/22)- RMP iso hetero 0.7x0.6x0.5 (no calcs, TR3), dominant isthmic 2.4x2.1x0.7 (TR3)  *** Sep 06, 2022 ***  doing well. good energy, but libido is still low. no hot flashes. erections are ok, rare sporadic am erections using axiron 2 depressions daily (last dose about 36 hrs ago) no recent labs or sono  *** Nov 23, 2021 ***  feels great. hot flashes and night sweats are gone. Energy is good.  tolerates Axiron well. Libido is still low dx'ed with KEVIN , in a process of adjusting his CPAP machine  *** Jun 17, 2021 ***  feels well. hot flashes and insomnia are significantly reduced since starting TRT. libido is still decreased, but energy has improved taking axiron 1 depression daily  Thyr US (4/5/21)-  RMP 0.5x0.3x0.5, isthmic 1.5x1.3x0.7 iso - all stable. Prev noted Left thyroid nodule is not seen  *** Mar 08, 2021 ***  feels better on axiron 1 depression daily , drisdol 50K qw much  less hot flashes, no more brain fog. Insomnia improved since switching to 1 depression *** prev intolerant of androgel  no recent thyr US labs from 2/19/21- LDL- 109, cRP- 6.5, 25D- 24, TSH- 2.6, T4- 7.3, PSA_ 0.9, H/H-14.7/44.7, normal lft's, a1c- 5.5  *** Nov 23, 2020 ***  felt much better on androderm 2 depressions daily (decr hot flashes, thinking more clear less fatigue), but developed an insomnia and "weird dreams". stopped b/o that after 2 weeks repeat testo- 269, free T- 40 FSH/LH-27/18  s/p FNAB (9/18/20) of left isthmic 1.4cm nodule - benign, adenomatous nodular goiter  *** Sep 11, 2020 ***  TSH- 0.4, FT4- 1.1, T3 - 128 + TSI/TBII ab prl- 5.5 am cortisol- 13.7 with ACTH-20 testo- 4.35, free T- 55 but FSH/LH- 29/17.6  patient states today, that he's been off meds for at least 8 years  Thyr US (8/27/20)- RMP iso 0.8x0.6x0.4 (no micro), LMP iso 0.6x0.4x0.3 (no micro), isthmic 1.0x1.3x0.7 hypo with small central cystic component (no micro)  HPI: Mr Eastman was diagnosed with Graves' disease about 15 years. He was under care of Dr. Snow at that time, and was treated with antithyroidal medications for about 3 years. He's been in remission since that time. He's concerned with his occasional dizziness and vertigo when he lies down for the past year. He saw an ENT and was diagnosed with Meniere disease. He also noticed some hot flashes, mostly at night. His erections are somewhat worse, and his libido has been very low. labs from 7/7/20- TSH- 0.88, T4- 7.8 from 12/17/19- TSH- 0.12 and from 7/3/19- 0.33 His ALP was persistently elevated ranging 123-143. he's not on any vitamin D supplements

## 2024-09-30 ENCOUNTER — NON-APPOINTMENT (OUTPATIENT)
Age: 69
End: 2024-09-30

## 2024-09-30 LAB
25(OH)D3 SERPL-MCNC: 38 NG/ML
ALBUMIN SERPL ELPH-MCNC: 4.5 G/DL
ALP BLD-CCNC: 103 U/L
ALT SERPL-CCNC: 27 U/L
ANION GAP SERPL CALC-SCNC: 15 MMOL/L
AST SERPL-CCNC: 32 U/L
BASOPHILS # BLD AUTO: 0.04 K/UL
BASOPHILS NFR BLD AUTO: 0.8 %
BILIRUB SERPL-MCNC: 0.3 MG/DL
BUN SERPL-MCNC: 23 MG/DL
CALCIUM SERPL-MCNC: 9.3 MG/DL
CHLORIDE SERPL-SCNC: 101 MMOL/L
CHOLEST SERPL-MCNC: 162 MG/DL
CO2 SERPL-SCNC: 22 MMOL/L
CREAT SERPL-MCNC: 1 MG/DL
EGFR: 80 ML/MIN/1.73M2
EOSINOPHIL # BLD AUTO: 0.11 K/UL
EOSINOPHIL NFR BLD AUTO: 2.1 %
ESTIMATED AVERAGE GLUCOSE: 114 MG/DL
GLUCOSE SERPL-MCNC: 100 MG/DL
HBA1C MFR BLD HPLC: 5.6 %
HCT VFR BLD CALC: 44.4 %
HDLC SERPL-MCNC: 38 MG/DL
HGB BLD-MCNC: 14.6 G/DL
IMM GRANULOCYTES NFR BLD AUTO: 0.4 %
LDLC SERPL CALC-MCNC: 107 MG/DL
LYMPHOCYTES # BLD AUTO: 1.39 K/UL
LYMPHOCYTES NFR BLD AUTO: 27.1 %
MAN DIFF?: NORMAL
MCHC RBC-ENTMCNC: 31 PG
MCHC RBC-ENTMCNC: 32.9 GM/DL
MCV RBC AUTO: 94.3 FL
MONOCYTES # BLD AUTO: 0.59 K/UL
MONOCYTES NFR BLD AUTO: 11.5 %
NEUTROPHILS # BLD AUTO: 2.98 K/UL
NEUTROPHILS NFR BLD AUTO: 58.1 %
NONHDLC SERPL-MCNC: 124 MG/DL
PLATELET # BLD AUTO: 287 K/UL
POTASSIUM SERPL-SCNC: 4.8 MMOL/L
PROT SERPL-MCNC: 7.1 G/DL
PSA SERPL-MCNC: 1.37 NG/ML
RBC # BLD: 4.71 M/UL
RBC # FLD: 13.5 %
SHBG SERPL-SCNC: 40.1 NMOL/L
SODIUM SERPL-SCNC: 138 MMOL/L
T3 SERPL-MCNC: 108 NG/DL
T4 FREE SERPL-MCNC: 1.1 NG/DL
TESTOST FREE SERPL-MCNC: 6.7 PG/ML
TESTOST SERPL-MCNC: 299 NG/DL
THYROGLOB AB SERPL-ACNC: 17.7 IU/ML
THYROPEROXIDASE AB SERPL IA-ACNC: 42.5 IU/ML
TRIGL SERPL-MCNC: 90 MG/DL
TSH RECEPTOR AB: <1.1 IU/L
TSH SERPL-ACNC: 1.73 UIU/ML
TSI ACT/NOR SER: <0.1 IU/L
WBC # FLD AUTO: 5.13 K/UL

## 2024-11-11 ENCOUNTER — NON-APPOINTMENT (OUTPATIENT)
Age: 69
End: 2024-11-11

## 2024-12-10 ENCOUNTER — NON-APPOINTMENT (OUTPATIENT)
Age: 69
End: 2024-12-10

## 2025-02-28 ENCOUNTER — APPOINTMENT (OUTPATIENT)
Dept: ENDOCRINOLOGY | Facility: CLINIC | Age: 70
End: 2025-02-28
Payer: COMMERCIAL

## 2025-02-28 VITALS
SYSTOLIC BLOOD PRESSURE: 114 MMHG | WEIGHT: 179.06 LBS | HEIGHT: 72 IN | BODY MASS INDEX: 24.25 KG/M2 | OXYGEN SATURATION: 98 % | DIASTOLIC BLOOD PRESSURE: 69 MMHG | RESPIRATION RATE: 17 BRPM | TEMPERATURE: 97.9 F | HEART RATE: 84 BPM

## 2025-02-28 DIAGNOSIS — E04.2 NONTOXIC MULTINODULAR GOITER: ICD-10-CM

## 2025-02-28 DIAGNOSIS — R68.82 DECREASED LIBIDO: ICD-10-CM

## 2025-02-28 DIAGNOSIS — Z86.39 PERSONAL HISTORY OF OTHER ENDOCRINE, NUTRITIONAL AND METABOLIC DISEASE: ICD-10-CM

## 2025-02-28 PROCEDURE — 36415 COLL VENOUS BLD VENIPUNCTURE: CPT

## 2025-02-28 PROCEDURE — 99214 OFFICE O/P EST MOD 30 MIN: CPT

## 2025-03-04 ENCOUNTER — TRANSCRIPTION ENCOUNTER (OUTPATIENT)
Age: 70
End: 2025-03-04

## 2025-03-04 LAB
25(OH)D3 SERPL-MCNC: 42.9 NG/ML
ALBUMIN SERPL ELPH-MCNC: 4.5 G/DL
ALP BLD-CCNC: 101 U/L
ALT SERPL-CCNC: 29 U/L
ANION GAP SERPL CALC-SCNC: 13 MMOL/L
AST SERPL-CCNC: 26 U/L
BASOPHILS # BLD AUTO: 0.06 K/UL
BASOPHILS NFR BLD AUTO: 1 %
BILIRUB SERPL-MCNC: 0.4 MG/DL
BUN SERPL-MCNC: 25 MG/DL
CALCIUM SERPL-MCNC: 9.3 MG/DL
CHLORIDE SERPL-SCNC: 101 MMOL/L
CHOLEST SERPL-MCNC: 182 MG/DL
CO2 SERPL-SCNC: 24 MMOL/L
CREAT SERPL-MCNC: 0.93 MG/DL
EGFR: 88 ML/MIN/1.73M2
EOSINOPHIL # BLD AUTO: 0.17 K/UL
EOSINOPHIL NFR BLD AUTO: 2.8 %
ESTIMATED AVERAGE GLUCOSE: 123 MG/DL
GLUCOSE SERPL-MCNC: 90 MG/DL
HBA1C MFR BLD HPLC: 5.9 %
HCT VFR BLD CALC: 44.2 %
HDLC SERPL-MCNC: 37 MG/DL
HGB BLD-MCNC: 15.1 G/DL
IMM GRANULOCYTES NFR BLD AUTO: 0.3 %
LDLC SERPL CALC-MCNC: 125 MG/DL
LYMPHOCYTES # BLD AUTO: 1.8 K/UL
LYMPHOCYTES NFR BLD AUTO: 29.8 %
MAN DIFF?: NORMAL
MCHC RBC-ENTMCNC: 31.2 PG
MCHC RBC-ENTMCNC: 34.2 G/DL
MCV RBC AUTO: 91.3 FL
MONOCYTES # BLD AUTO: 0.81 K/UL
MONOCYTES NFR BLD AUTO: 13.4 %
NEUTROPHILS # BLD AUTO: 3.18 K/UL
NEUTROPHILS NFR BLD AUTO: 52.7 %
NONHDLC SERPL-MCNC: 144 MG/DL
PLATELET # BLD AUTO: 303 K/UL
POTASSIUM SERPL-SCNC: 4.4 MMOL/L
PROT SERPL-MCNC: 7 G/DL
PSA SERPL-MCNC: 1.56 NG/ML
RBC # BLD: 4.84 M/UL
RBC # FLD: 13.3 %
SHBG SERPL-SCNC: 41.5 NMOL/L
SODIUM SERPL-SCNC: 138 MMOL/L
T3 SERPL-MCNC: 117 NG/DL
T4 FREE SERPL-MCNC: 1 NG/DL
TESTOST FREE SERPL-MCNC: 2.8 PG/ML
TESTOST SERPL-MCNC: 436 NG/DL
THYROGLOB AB SERPL-ACNC: 15.9 IU/ML
THYROPEROXIDASE AB SERPL IA-ACNC: 34.4 IU/ML
TRIGL SERPL-MCNC: 103 MG/DL
TSH RECEPTOR AB: <1.1 IU/L
TSH SERPL-ACNC: 2.14 UIU/ML
TSI ACT/NOR SER: <0.1 IU/L
WBC # FLD AUTO: 6.04 K/UL

## 2025-04-04 ENCOUNTER — NON-APPOINTMENT (OUTPATIENT)
Age: 70
End: 2025-04-04

## 2025-04-11 ENCOUNTER — TRANSCRIPTION ENCOUNTER (OUTPATIENT)
Age: 70
End: 2025-04-11

## 2025-05-23 ENCOUNTER — NON-APPOINTMENT (OUTPATIENT)
Age: 70
End: 2025-05-23

## 2025-05-29 ENCOUNTER — TRANSCRIPTION ENCOUNTER (OUTPATIENT)
Age: 70
End: 2025-05-29

## 2025-07-21 ENCOUNTER — NON-APPOINTMENT (OUTPATIENT)
Age: 70
End: 2025-07-21

## 2025-07-21 ENCOUNTER — TRANSCRIPTION ENCOUNTER (OUTPATIENT)
Age: 70
End: 2025-07-21

## 2025-09-05 ENCOUNTER — APPOINTMENT (OUTPATIENT)
Dept: ENDOCRINOLOGY | Facility: CLINIC | Age: 70
End: 2025-09-05
Payer: COMMERCIAL

## 2025-09-05 VITALS
TEMPERATURE: 98.3 F | HEART RATE: 75 BPM | HEIGHT: 72 IN | WEIGHT: 177.56 LBS | SYSTOLIC BLOOD PRESSURE: 107 MMHG | RESPIRATION RATE: 17 BRPM | BODY MASS INDEX: 24.05 KG/M2 | OXYGEN SATURATION: 97 % | DIASTOLIC BLOOD PRESSURE: 65 MMHG

## 2025-09-05 DIAGNOSIS — R68.82 DECREASED LIBIDO: ICD-10-CM

## 2025-09-05 DIAGNOSIS — Z86.39 PERSONAL HISTORY OF OTHER ENDOCRINE, NUTRITIONAL AND METABOLIC DISEASE: ICD-10-CM

## 2025-09-05 DIAGNOSIS — E04.2 NONTOXIC MULTINODULAR GOITER: ICD-10-CM

## 2025-09-05 PROCEDURE — 99214 OFFICE O/P EST MOD 30 MIN: CPT

## 2025-09-05 PROCEDURE — 36415 COLL VENOUS BLD VENIPUNCTURE: CPT

## 2025-09-05 PROCEDURE — G2211 COMPLEX E/M VISIT ADD ON: CPT | Mod: NC

## 2025-09-07 LAB — SHBG SERPL-SCNC: 46.8 NMOL/L

## 2025-09-10 ENCOUNTER — TRANSCRIPTION ENCOUNTER (OUTPATIENT)
Age: 70
End: 2025-09-10

## 2025-09-10 LAB
TESTOST FREE SERPL-MCNC: 7.5 PG/ML
TESTOST SERPL-MCNC: 360 NG/DL